# Patient Record
Sex: FEMALE | Race: BLACK OR AFRICAN AMERICAN | NOT HISPANIC OR LATINO | Employment: UNEMPLOYED | URBAN - METROPOLITAN AREA
[De-identification: names, ages, dates, MRNs, and addresses within clinical notes are randomized per-mention and may not be internally consistent; named-entity substitution may affect disease eponyms.]

---

## 2023-12-13 ENCOUNTER — HOSPITAL ENCOUNTER (EMERGENCY)
Facility: OTHER | Age: 33
Discharge: HOME | End: 2023-12-13
Attending: EMERGENCY MEDICINE

## 2023-12-13 VITALS
DIASTOLIC BLOOD PRESSURE: 72 MMHG | WEIGHT: 182 LBS | HEART RATE: 80 BPM | SYSTOLIC BLOOD PRESSURE: 118 MMHG | TEMPERATURE: 98 F | OXYGEN SATURATION: 100 % | HEIGHT: 67 IN | BODY MASS INDEX: 28.56 KG/M2 | RESPIRATION RATE: 18 BRPM

## 2023-12-13 DIAGNOSIS — J06.9 VIRAL URI WITH COUGH: Primary | ICD-10-CM

## 2023-12-13 LAB
AMORPH CRY URNS QL MICRO: ABNORMAL
B-HCG UR QL: NEGATIVE
BACTERIA #/AREA URNS HPF: ABNORMAL /HPF
BILIRUB UR QL STRIP: NEGATIVE
CLARITY UR: ABNORMAL
COLOR UR: ABNORMAL
CTP QC/QA: YES
GLUCOSE UR QL STRIP: NEGATIVE
HGB UR QL STRIP: NEGATIVE
KETONES UR QL STRIP: NEGATIVE
LEUKOCYTE ESTERASE UR QL STRIP: NEGATIVE
MICROSCOPIC COMMENT: ABNORMAL
NITRITE UR QL STRIP: NEGATIVE
PH UR STRIP: 6 [PH] (ref 5–8)
POC MOLECULAR INFLUENZA A AGN: NEGATIVE
POC MOLECULAR INFLUENZA B AGN: NEGATIVE
PROT UR QL STRIP: NEGATIVE
RBC #/AREA URNS HPF: 5 /HPF (ref 0–4)
SARS-COV-2 RDRP RESP QL NAA+PROBE: NEGATIVE
SP GR UR STRIP: 1.02 (ref 1–1.03)
SQUAMOUS #/AREA URNS HPF: 3 /HPF
URN SPEC COLLECT METH UR: ABNORMAL
UROBILINOGEN UR STRIP-ACNC: NEGATIVE EU/DL
WBC #/AREA URNS HPF: 11 /HPF (ref 0–5)

## 2023-12-13 PROCEDURE — 99282 EMERGENCY DEPT VISIT SF MDM: CPT

## 2023-12-13 PROCEDURE — 81025 URINE PREGNANCY TEST: CPT | Performed by: EMERGENCY MEDICINE

## 2023-12-13 PROCEDURE — 87635 SARS-COV-2 COVID-19 AMP PRB: CPT | Performed by: EMERGENCY MEDICINE

## 2023-12-13 PROCEDURE — 81000 URINALYSIS NONAUTO W/SCOPE: CPT | Performed by: EMERGENCY MEDICINE

## 2023-12-13 PROCEDURE — 87086 URINE CULTURE/COLONY COUNT: CPT | Performed by: EMERGENCY MEDICINE

## 2023-12-13 NOTE — ED TRIAGE NOTES
Radha Kenneth Adkins, a 33 y.o. female presents to the ED w/ complaint of cough x 2 days and concern for COVID infection.     Triage note:  Chief Complaint   Patient presents with    Cough     PT here for vacation and started to have a cough two days ago and is concerned for COVID infection. PT denies SOB, chest pain, N/V/D and fever/chills.      Review of patient's allergies indicates:  No Known Allergies  No past medical history on file.

## 2023-12-14 LAB — BACTERIA UR CULT: NO GROWTH

## 2024-04-17 NOTE — ED PROVIDER NOTES
Diagnosis:   Lumbar spondylosis (M47.816)        Referring Provider: Valentin Cantu  Date of Evaluation: 4/3/2024    Precautions:  None Next MD visit: none scheduled  Date of Surgery: n/a     Insurance Primary/Secondary: MEDICARE / N/A       # Auth Visits: 12   Total Timed Treatment: 40 min  Date POC Expires: 7/2   Total Treatment time: 45 min       Charges: There Ex 2, NM Re-ed 1       Treatment Number: 2    Subjective: Pt states over the weekend she had pain in the L lower thoracic region which increased with L rotation. This has resolved. Overall she notes no significant change in the lumbar pain, but she does get some relief from the HEP. She did water aerobics and bernardino well. .  Pain: 6/10  B lumbar    Objective/Goals: Rx: flow chart     4/10/2024   Visit: 2  4/17/2024   3      HEP Post pelvic tilt  SKC  DKC  Piriformis stretch  LTR  Clam  TA Standing hip flexor stretch  Bridge  Abd sidelying     Therapeutic Exercises Post pelvic tilt x 3  SKC x 1  DKC x 2  Piriformis stretch x 1  LTR x 3  Clam x 10  TA retraining  35'  Standing hip flexor stretch x 3  Bridge x 10  Abd 10 x 2  NuStep seat and arms 12, L5 x 6'   30'     Manual       Neuro ReEducation     Heel slide  Bent knee fall out  10'     Therapeutic Activity Inst in back protection techniques for bending and lifting with application to household chores and lifting children at work  10'      Modalities         HEP to date: post pelvic tilt, SKC, DKC, LTR, piriformis stretch, clam, abd, bridge, TA, heel slide, bent knee fall out, standing hip flexor stretch,    Education:     Assessment: Able to perform all above without provocation      Plan: review abdominal stab, abd, bridge, and hip flexor stretch.  From initial eval for reference:  complaints of intermittent pain in B mid to lower lumbar region. She also reports occasional ant hip pain. She states when the pain is worse in the R lumbar region, it impacts her gait with the R LE. Aggravates: bending,  Encounter Date: 12/13/2023       History     Chief Complaint   Patient presents with    Cough     PT here for vacation and started to have a cough two days ago and is concerned for COVID infection. PT denies SOB, chest pain, N/V/D and fever/chills.      33-year-old female, healthy complaining of cough times 3-4 days, nonproductive, with no sore throat runny nose.  No fevers or chills.  Patient is staying with some family and other people in the family are sick with URI type symptoms.  She was worried that she might have COVID.  No chest pain or shortness of breath.    The history is provided by the patient.     Review of patient's allergies indicates:  No Known Allergies  History reviewed. No pertinent past medical history.  History reviewed. No pertinent surgical history.  History reviewed. No pertinent family history.  Social History     Tobacco Use    Smoking status: Never     Passive exposure: Never    Smokeless tobacco: Never   Substance Use Topics    Drug use: Never     Review of Systems    Physical Exam     Initial Vitals   BP Pulse Resp Temp SpO2   12/13/23 0601 12/13/23 0601 12/13/23 0601 12/13/23 0603 12/13/23 0601   116/62 79 16 98.2 °F (36.8 °C) 99 %      MAP       --                Physical Exam  Gen: well-appearing, in NAD  HEENT: MMM, OP clear  Pulm: Comfortable respirations, no signs of respiratory distress, no increased work of breathing/no significant tachypnea or accessory muscle use, lungs CTA B  Skin: no rash  Ext: no edema  Neuro: alert, answering questions appropriately, no focal weakness    ED Course   Procedures  Labs Reviewed   URINALYSIS, REFLEX TO URINE CULTURE - Abnormal; Notable for the following components:       Result Value    Color, UA Brown (*)     Appearance, UA Cloudy (*)     All other components within normal limits    Narrative:     Specimen Source->Urine   URINALYSIS MICROSCOPIC - Abnormal; Notable for the following components:    RBC, UA 5 (*)     WBC, UA 11 (*)     Bacteria  Few (*)     All other components within normal limits    Narrative:     Specimen Source->Urine   CULTURE, URINE   POCT URINE PREGNANCY   POCT INFLUENZA A/B MOLECULAR   SARS-COV-2 RDRP GENE          Imaging Results    None          Medications - No data to display  Medical Decision Making  I strongly suspect that this patient's clinical picture is consistent with an acute viral illness.  At this point in time, there are no signs of a serious bacterial infection like pyelonephritis, pneumonia, bacteremia, etc.  For this reason, it would not be safe or appropriate for this patient to be prescribed antibiotics as the potential harms (rash, diarrhea, allergic reaction) would greatly outweigh any potential benefits.  I have recommended OTC medicines for symptomatic management and expect resolution of symptoms with more time.  ED return precautions given.      Amount and/or Complexity of Data Reviewed  Labs: ordered. Decision-making details documented in ED Course.                                      Clinical Impression:  Final diagnoses:  [J06.9] Viral URI with cough (Primary)          ED Disposition Condition    Discharge Stable          ED Prescriptions    None       Follow-up Information       Follow up With Specialties Details Why Contact Info    [facility] - Emergency Dept Emergency Medicine  If symptoms worsen [id number] [facility] [id number] [phone]             Masha Salamanca MD  12/13/23 1619     lifting, prolonged walking; Relieves: ibuprofen 2 pills 2-3x/day as needed.  Pt describes pain level current 5/10, at best 0/10, at worst 8/10.